# Patient Record
Sex: FEMALE | Race: WHITE
[De-identification: names, ages, dates, MRNs, and addresses within clinical notes are randomized per-mention and may not be internally consistent; named-entity substitution may affect disease eponyms.]

---

## 2018-10-19 ENCOUNTER — HOSPITAL ENCOUNTER (OUTPATIENT)
Dept: HOSPITAL 61 - PCVCIMAG | Age: 38
Discharge: HOME | End: 2018-10-19
Attending: INTERNAL MEDICINE
Payer: COMMERCIAL

## 2018-10-19 DIAGNOSIS — R06.09: Primary | ICD-10-CM

## 2018-10-19 DIAGNOSIS — R94.31: ICD-10-CM

## 2018-10-19 PROCEDURE — 93306 TTE W/DOPPLER COMPLETE: CPT

## 2018-10-19 PROCEDURE — 93351 STRESS TTE COMPLETE: CPT

## 2018-10-19 NOTE — PCVCIMAG
--------------- APPROVED REPORT --------------





Study performed:  10/19/2018 13:53:57



Exam:  Stress Echocardiogram

Indication: Dyspnea, ABN EKG

Patient Location: Echo lab

Stress Nurse: Daphney Bowie RN

Status: routine



Ht: 5 ft 5 in  

HR: 77 bpm      BP: 108/68 mmHg

Rhythm: NSR



Procedure

The patient underwent an Exercise Stress Test using the Errol 

Protocol. Blood pressure, heart rate, and EKG were monitored.

An Echocardiogram was performed by technician in four stages in quad 

fashion.  At peak stress, four selected images were obtained and 

placed side by side with resting images for comparison.



Stress Test Details

Stress Test:  Exercise stress testing was performed using a Errol 

protocol.

HR

Resting HR:            77 bpmMax Heart Rate (APMHR): 181 bpm 

Max HR Achieved:  151 bpmTarget HR (85% APMHR): 153 bpm

% of APMHR:         83

Recovery HR:            95 bpm

HR response to stress: Normal HR response to stress



BP

Resting BP:  108/68 mmHg

Max BP:       122/80 mmHg

Recovery BP:       112/70 mmHg



ECG

Resting ECG:  Sinus Rhythm, nonspecific ST-T 

abnormalities

Stress ECG:     Sinus Rhythm, nonspecific ST-T abnormalities

ST Change: Non-ischemic

Arrhythmia:    None

Recovery ECG: Sinus Rhythm, nonspecific ST-T 

abnormalities

Recovery ST Change: Normal

Recovery Arrhythmia: None



Clinical

Reason for Termination: Maximal effort

Stress Symptoms: Dyspnea, leg discomfort

Exercise duration: 9 min 19 sec

Highest Stage Achieved: Stage 4: 4.2 mph at 16% grade. 

Exercise capacity: 11 METs

Overall Exercise Capacity for Age: Normal

Scale: Active

Angina Score: None



Pre-Stress Echo

The resting Echocardiogram showed normal left ventricular 

contractility with an estimated Ejection Fraction of about &gt;55%. 

Normal wall motion in all segments on baseline images.



Post-Stress Echo

The stress Echocardiogram showed normal left ventricular 

contractility with an estimated Ejection Fraction of about 65%. 

Normal augmentation of wall motion in all segments on post stress 

images.



Clinical

No clinical or ECG evidence for ischemia.



Conclusion

Clinical Response:  Non-ischemic

Exercise Capacity:  Average

Stress ECG Response:  Non-ischemic

Stress Echo Images:  Non-ischemic

The left ventricle is normal in size and wall thickness in both the 

rest and stress images.



Other Information

Study Quality: Adequate



&lt;Conclusion&gt;

The left ventricle is normal in size and wall thickness in both the 

rest and stress images.

## 2018-10-19 NOTE — [facility]AG
--------------- APPROVED REPORT --------------





Study performed:  10/19/2018 13:27:02



EXAM: Comprehensive 2D, Doppler, and color-flow 

Echocardiogram

Patient Location: Echo lab

Status:  routine



BSA:         1.90

HR: 71 bpmBP:          108/68 mmHg

Rhythm: NSR



Other Information 

Study Quality: Adequate



Indications

Abnormal ECG 

Hypotension

Dyspnea 



2D Dimensions

IVSd:  10.06 (7-11mm)

LVDd:  52.61 mm

PWd:  8.00 (7-11mm)

LVDs:  37.20 (25-40mm)

Left Atrium:  29.91 (27-40mm)

Aortic Root:  30.29 mm

LV Single Plane 4CH:  56.68 %

LV Single Plane 2CH:  60.37 %

Biplane EF:  58.0 %



Volumes

Left Atrial Volume (Systole)

Single Plane 4CH:  50.48 mLSingle Plane 2CH:  53.76 mL

LA ESV Index:  27.00 mL/m2



Aortic Valve

AoV Peak Adelfo.:  1.17 m/s

AO Peak Gr.:  5.43 mmHgLVOT Max PG:  3.38 mmHg

LVOT Max V:  0.92 m/s



Mitral Valve

E/A Ratio:  1.5

MV Decel. Time:  187.90 ms

MV E Max Adelfo.:  0.52 m/s

MV A Adelfo.:  0.34 m/s

IVRT:  89.97 ms



Pulmonary Valve

PV Peak Adelfo.:  0.82 m/sPV Peak Gr.:  2.70 mmHg



Pulmonary Vein

P Vein S:    0.36 m/sP Vein A:  0.33 m/s

P Vein D:   0.34 m/sP Vein A Dur.:  128.0 msec

P Vein S/D Ratio:  1.06



Tricuspid Valve

TR Peak Adelfo.:  2.26 m/s

TR Peak Gr.:  20.50 mmHg



Left Ventricle

The left ventricle is normal size. There is normal LV segmental wall 

motion. There is normal left ventricular wall thickness. Left 

ventricular systolic function is normal. The left ventricular 

ejection fraction is within the normal range. LVEF is 60%. The left 

ventricular diastolic function is normal.



Right Ventricle

The right ventricle is normal size. The right ventricular systolic 

function is normal.



Atria

The left atrium size is normal. The right atrium size is 

normal.



Aortic Valve

The aortic valve is normal in structure. No aortic regurgitation is 

present. There is no aortic valvular stenosis.



Mitral Valve

The mitral valve is normal in structure. Trace mitral regurgitation. 

No evidence of mitral valve stenosis.



Tricuspid Valve

The tricuspid valve is normal in structure. Trace tricuspid 

regurgitation with PAP of 28 mmHg.



Pulmonic Valve

The pulmonary valve is normal in structure. There is no pulmonic 

valvular regurgitation.



Great Vessels

The aortic root is normal in size. IVC is normal in size and 

collapses &gt;50% with inspiration.



Pericardium

There is no pericardial effusion. There is no pleural 

effusion.



&lt;Conclusion&gt;

The left ventricle is normal size.

There is normal left ventricular wall thickness.

Left ventricular systolic function is normal.

The left ventricular diastolic function is normal.

The right ventricle is normal size.

The left atrium size is normal.

The aortic valve is normal in structure.

Trace mitral regurgitation.

Trace tricuspid regurgitation with PAP of 28 mmHg.